# Patient Record
Sex: MALE | Race: WHITE | NOT HISPANIC OR LATINO
[De-identification: names, ages, dates, MRNs, and addresses within clinical notes are randomized per-mention and may not be internally consistent; named-entity substitution may affect disease eponyms.]

---

## 2021-01-03 ENCOUNTER — FORM ENCOUNTER (OUTPATIENT)
Age: 66
End: 2021-01-03

## 2021-01-04 ENCOUNTER — TRANSCRIPTION ENCOUNTER (OUTPATIENT)
Age: 66
End: 2021-01-04

## 2021-01-04 PROBLEM — Z00.00 ENCOUNTER FOR PREVENTIVE HEALTH EXAMINATION: Status: ACTIVE | Noted: 2021-01-04

## 2021-01-06 ENCOUNTER — APPOINTMENT (OUTPATIENT)
Dept: DISASTER EMERGENCY | Facility: HOSPITAL | Age: 66
End: 2021-01-06

## 2021-01-06 ENCOUNTER — OUTPATIENT (OUTPATIENT)
Dept: OUTPATIENT SERVICES | Facility: HOSPITAL | Age: 66
LOS: 1 days | End: 2021-01-06
Payer: COMMERCIAL

## 2021-01-06 VITALS
HEART RATE: 64 BPM | OXYGEN SATURATION: 95 % | SYSTOLIC BLOOD PRESSURE: 100 MMHG | RESPIRATION RATE: 20 BRPM | HEIGHT: 71 IN | WEIGHT: 218.26 LBS | TEMPERATURE: 100 F | DIASTOLIC BLOOD PRESSURE: 61 MMHG

## 2021-01-06 VITALS
HEART RATE: 66 BPM | TEMPERATURE: 98 F | OXYGEN SATURATION: 96 % | RESPIRATION RATE: 20 BRPM | DIASTOLIC BLOOD PRESSURE: 63 MMHG | SYSTOLIC BLOOD PRESSURE: 117 MMHG

## 2021-01-06 DIAGNOSIS — U07.1 COVID-19: ICD-10-CM

## 2021-01-06 PROCEDURE — M0239: CPT

## 2021-01-06 RX ORDER — BAMLANIVIMAB 35 MG/ML
700 INJECTION, SOLUTION INTRAVENOUS ONCE
Refills: 0 | Status: COMPLETED | OUTPATIENT
Start: 2021-01-06 | End: 2021-01-06

## 2021-01-06 RX ADMIN — BAMLANIVIMAB 270 MILLIGRAM(S): 35 INJECTION, SOLUTION INTRAVENOUS at 12:00

## 2021-01-06 NOTE — CHART NOTE - NSCHARTNOTEFT_GEN_A_CORE
CC: Monoclonal Antibody Infusion/COVID 19 Positive  65yMale with pmhx of HTN & HLD, presents today for monoclonal antibody infusion. Patient endorses onset of symptoms 12/28 consisting of malaise & lightheadedness, tested + for COVID on 1/3, and subsequently referred by PCP-Dr. Mendy Harp for infusion today.    exam/findings:  T(C): --  HR: --  BP: --  RR: --  SpO2: --      PE:   Appearance: NAD	  HEENT:   Normal oral mucosa,   Lymphatic: No lymphadenopathy  Cardiovascular: Normal S1 S2, No JVD, No murmurs, No edema  Respiratory: Lungs clear to auscultation	  Gastrointestinal:  Soft, Non-tender, + BS	  Skin: warm and dry  Neurologic: Non-focal  Extremities: Normal range of motion,    ASSESSMENT:  Pt is a 65yMale with pmhx of HTN & HLD, tested + for COVID on 1/3, and subsequently referred by PCP-Dr. Mendy Harp today to infusion center for Monoclonal antibody infusion (Bamlanivimab)  Symptoms/ Criteria: malaise & lightheadedness  Risk Profile includes: Age >55, hx of HTN    PLAN:  - Infusion procedure explained to patient   - Consent for monoclonal antibody infusion obtained   - Risk & benefits discussed/all questions answered  - Infuse Bamlanivimab 700mg  IV over one hour   - Observe patient for one hour post infusion    I have reviewed the Bamlanivimab Emergency Use Authorization (EUA) and I have provided the patient or patient's caregiver with the following information:      1. FDA has authorized emergency use Bamlanivimab, which is not an FDA-approved biological product.  2. The patient or patient's caregiver has the option to accept or refuse administration of Bamlanivimab.   3. The significant known and potential risks and benefits of Bamlanivimab and the extent to which such risks and benefits are unknown.  4. Information on available alternative treatments and risks and benefits of those alternatives. CC: Monoclonal Antibody Infusion/COVID 19 Positive  65yMale with pmhx of HTN & HLD, presents today for monoclonal antibody infusion. Patient endorses onset of symptoms 12/28 consisting of malaise & lightheadedness, tested + for COVID on 1/3, and subsequently referred by PCP-Dr. Mendy Harp for infusion today.    exam/findings:  Vital Signs Last 24 Hrs  T(C): 36.7 (06 Jan 2021 14:10), Max: 37.5 (06 Jan 2021 11:28)  T(F): 98.1 (06 Jan 2021 14:10), Max: 99.5 (06 Jan 2021 11:28)  HR: 66 (06 Jan 2021 14:10) (60 - 66)  BP: 117/63 (06 Jan 2021 14:10) (100/61 - 117/63)  RR: 20 (06 Jan 2021 14:10) (14 - 20)  SpO2: 96% (06 Jan 2021 14:10) (95% - 96%)      PE:   Appearance: NAD	  HEENT:   Normal oral mucosa,   Lymphatic: No lymphadenopathy  Cardiovascular: Normal S1 S2, No JVD, No murmurs, No edema  Respiratory: Lungs clear to auscultation	  Gastrointestinal:  Soft, Non-tender, + BS	  Skin: warm and dry  Neurologic: Non-focal  Extremities: Normal range of motion,    ASSESSMENT:  Pt is a 65yMale with pmhx of HTN & HLD, tested + for COVID on 1/3, and subsequently referred by PCP-Dr. Mendy Harp today to infusion center for Monoclonal antibody infusion (Bamlanivimab)  Symptoms/ Criteria: malaise & lightheadedness  Risk Profile includes: Age >55, hx of HTN    PLAN:  - Infusion procedure explained to patient   - Consent for monoclonal antibody infusion obtained   - Risk & benefits discussed/all questions answered  - Infuse Bamlanivimab 700mg  IV over one hour   - Observe patient for one hour post infusion    I have reviewed the Bamlanivimab Emergency Use Authorization (EUA) and I have provided the patient or patient's caregiver with the following information:      1. FDA has authorized emergency use Bamlanivimab, which is not an FDA-approved biological product.  2. The patient or patient's caregiver has the option to accept or refuse administration of Bamlanivimab.   3. The significant known and potential risks and benefits of Bamlanivimab and the extent to which such risks and benefits are unknown.  4. Information on available alternative treatments and risks and benefits of those alternatives.

## 2021-01-07 ENCOUNTER — TRANSCRIPTION ENCOUNTER (OUTPATIENT)
Age: 66
End: 2021-01-07

## 2021-01-12 ENCOUNTER — TRANSCRIPTION ENCOUNTER (OUTPATIENT)
Age: 66
End: 2021-01-12